# Patient Record
Sex: MALE | Race: WHITE | URBAN - NONMETROPOLITAN AREA
[De-identification: names, ages, dates, MRNs, and addresses within clinical notes are randomized per-mention and may not be internally consistent; named-entity substitution may affect disease eponyms.]

---

## 2018-01-22 ENCOUNTER — OFFICE VISIT (OUTPATIENT)
Dept: URGENT CARE | Age: 48
End: 2018-01-22
Payer: COMMERCIAL

## 2018-01-22 VITALS
BODY MASS INDEX: 35.21 KG/M2 | OXYGEN SATURATION: 98 % | RESPIRATION RATE: 18 BRPM | WEIGHT: 260 LBS | DIASTOLIC BLOOD PRESSURE: 87 MMHG | HEIGHT: 72 IN | SYSTOLIC BLOOD PRESSURE: 133 MMHG | HEART RATE: 90 BPM | TEMPERATURE: 98.4 F

## 2018-01-22 DIAGNOSIS — J01.10 ACUTE NON-RECURRENT FRONTAL SINUSITIS: Primary | ICD-10-CM

## 2018-01-22 DIAGNOSIS — J02.9 SORE THROAT: ICD-10-CM

## 2018-01-22 LAB — S PYO AG THROAT QL: NORMAL

## 2018-01-22 PROCEDURE — G8484 FLU IMMUNIZE NO ADMIN: HCPCS | Performed by: PHYSICIAN ASSISTANT

## 2018-01-22 PROCEDURE — G8427 DOCREV CUR MEDS BY ELIG CLIN: HCPCS | Performed by: PHYSICIAN ASSISTANT

## 2018-01-22 PROCEDURE — 4004F PT TOBACCO SCREEN RCVD TLK: CPT | Performed by: PHYSICIAN ASSISTANT

## 2018-01-22 PROCEDURE — G8417 CALC BMI ABV UP PARAM F/U: HCPCS | Performed by: PHYSICIAN ASSISTANT

## 2018-01-22 PROCEDURE — 99202 OFFICE O/P NEW SF 15 MIN: CPT | Performed by: PHYSICIAN ASSISTANT

## 2018-01-22 PROCEDURE — 87880 STREP A ASSAY W/OPTIC: CPT | Performed by: PHYSICIAN ASSISTANT

## 2018-01-22 RX ORDER — GUAIFENESIN 400 MG/1
400 TABLET ORAL 4 TIMES DAILY PRN
COMMUNITY

## 2018-01-22 RX ORDER — AMOXICILLIN AND CLAVULANATE POTASSIUM 500; 125 MG/1; MG/1
1 TABLET, FILM COATED ORAL 2 TIMES DAILY
Qty: 20 TABLET | Refills: 0 | Status: SHIPPED | OUTPATIENT
Start: 2018-01-22 | End: 2018-02-01

## 2018-01-22 ASSESSMENT — ENCOUNTER SYMPTOMS
VOMITING: 0
ABDOMINAL PAIN: 0
NAUSEA: 0
SORE THROAT: 1
DIARRHEA: 0
RHINORRHEA: 1
COUGH: 1

## 2018-01-23 NOTE — PATIENT INSTRUCTIONS
hot, wet towel or a warm gel pack on your face 3 or 4 times a day for 5 to 10 minutes each time. · Try a decongestant nasal spray like oxymetazoline (Afrin). Do not use it for more than 3 days in a row. Using it for more than 3 days can make your congestion worse. When should you call for help? Call your doctor now or seek immediate medical care if:  ? · You have new or worse swelling or redness in your face or around your eyes. ? · You have a new or higher fever. ? Watch closely for changes in your health, and be sure to contact your doctor if:  ? · You have new or worse facial pain. ? · The mucus from your nose becomes thicker (like pus) or has new blood in it. ? · You are not getting better as expected. Where can you learn more? Go to [email or web address]://boo-boxpepicMind-NRGeb.[email or web address] org and sign in to your [facility] account. Enter F456 in the Polleverywhere box to learn more about \"Sinusitis: Care Instructions. \"     If you do not have an account, please click on the \"Sign Up Now\" link. Current as of: May 12, 2017  Content Version: 11.5  © 9283-3399 Healthwise, Incorporated. Care instructions adapted under license by [facility] ([facility]). If you have questions about a medical condition or this instruction, always ask your healthcare professional. [address]  any warranty or liability for your use of this information.

## 2018-04-10 NOTE — PROGRESS NOTES
Reviewed chart. Agree with assessment and plan.
mood and affect. His behavior is normal. Judgment and thought content normal.   Nursing note and vitals reviewed. Assessment:      Acute Sinusitis      Plan:      - Rapid strep negative. - Start Augmentin today. Take 1 tablet by mouth twice daily for 10 days.   - May continue OTC meds as needed for cough. - Notify clinic with any change in or worsening of symptoms.   - Return as needed.

## 2023-08-25 ENCOUNTER — OFFICE VISIT (OUTPATIENT)
Dept: FAMILY MEDICINE CLINIC | Facility: CLINIC | Age: 53
End: 2023-08-25
Payer: COMMERCIAL

## 2023-08-25 VITALS
DIASTOLIC BLOOD PRESSURE: 90 MMHG | BODY MASS INDEX: 38.46 KG/M2 | HEIGHT: 73 IN | WEIGHT: 290.2 LBS | TEMPERATURE: 97.3 F | OXYGEN SATURATION: 96 % | SYSTOLIC BLOOD PRESSURE: 130 MMHG | HEART RATE: 90 BPM

## 2023-08-25 DIAGNOSIS — Z12.11 SCREEN FOR COLON CANCER: ICD-10-CM

## 2023-08-25 DIAGNOSIS — Z00.00 ANNUAL PHYSICAL EXAM: Primary | ICD-10-CM

## 2023-08-25 DIAGNOSIS — R00.2 PALPITATIONS: ICD-10-CM

## 2023-08-25 DIAGNOSIS — I47.1 SVT (SUPRAVENTRICULAR TACHYCARDIA): ICD-10-CM

## 2023-08-25 DIAGNOSIS — E66.01 CLASS 2 SEVERE OBESITY DUE TO EXCESS CALORIES WITH SERIOUS COMORBIDITY AND BODY MASS INDEX (BMI) OF 38.0 TO 38.9 IN ADULT: ICD-10-CM

## 2023-08-25 DIAGNOSIS — R53.83 FATIGUE, UNSPECIFIED TYPE: ICD-10-CM

## 2023-08-25 DIAGNOSIS — R07.9 CHEST PAIN, UNSPECIFIED TYPE: ICD-10-CM

## 2023-08-25 DIAGNOSIS — E03.9 HYPOTHYROIDISM, UNSPECIFIED TYPE: ICD-10-CM

## 2023-08-25 DIAGNOSIS — Z72.0 TOBACCO ABUSE: ICD-10-CM

## 2023-08-25 DIAGNOSIS — I10 PRIMARY HYPERTENSION: ICD-10-CM

## 2023-08-25 DIAGNOSIS — Z12.5 SCREENING PSA (PROSTATE SPECIFIC ANTIGEN): ICD-10-CM

## 2023-08-25 PROBLEM — E66.812 CLASS 2 SEVERE OBESITY DUE TO EXCESS CALORIES WITH SERIOUS COMORBIDITY AND BODY MASS INDEX (BMI) OF 38.0 TO 38.9 IN ADULT: Status: ACTIVE | Noted: 2023-08-25

## 2023-08-25 RX ORDER — LEVOTHYROXINE SODIUM 0.03 MG/1
25 TABLET ORAL
Qty: 30 TABLET | Refills: 1 | Status: SHIPPED | OUTPATIENT
Start: 2023-08-25

## 2023-08-25 RX ORDER — LEVOTHYROXINE SODIUM 0.03 MG/1
25 TABLET ORAL
Qty: 90 TABLET | OUTPATIENT
Start: 2023-08-25

## 2023-08-25 RX ORDER — LISINOPRIL 10 MG/1
10 TABLET ORAL DAILY
Qty: 90 TABLET | Refills: 1 | Status: SHIPPED | OUTPATIENT
Start: 2023-08-25

## 2023-08-25 NOTE — PROGRESS NOTES
Chief Complaint  Annual Exam    Subjective          Vernon Dumont presents to [facility]  History of Present Illness  Pt needs to get established  Reviewed hospital records from [facility]  Pt had svt in hospital- was not able to complete cardiac stress test  Pt smokes 2ppd x 40 yrs  Pt has no h/o seizures    Class 2 Severe Obesity (BMI >=35 and <=39.9). Obesity-related health conditions include the following: hypertension. Obesity is improving with lifestyle modifications. BMI is is above average; BMI management plan is completed. We discussed portion control and increasing exercise.       Objective   Allergies   Allergen Reactions    Codeine Shortness Of Breath    Hydrocodone GI Intolerance    Morphine GI Intolerance     Immunization History   Administered Date(s) Administered    Tdap 08/06/2018     Past Medical History:   Diagnosis Date    ADHD (attention deficit hyperactivity disorder)     Asthma       History reviewed. No pertinent surgical history.   Social History     Socioeconomic History    Marital status:    Tobacco Use    Smoking status: Every Day     Packs/day: 0.50     Years: 44.00     Pack years: 22.00     Types: Cigarettes     Start date: 1979    Smokeless tobacco: Current     Types: Chew   Substance and Sexual Activity    Alcohol use: Not Currently    Drug use: Never    Sexual activity: Defer        Current Outpatient Medications:     levothyroxine (Synthroid) 25 MCG tablet, Take 1 tablet by mouth Every Morning., Disp: 30 tablet, Rfl: 1    lisinopril (PRINIVIL,ZESTRIL) 10 MG tablet, Take 1 tablet by mouth Daily., Disp: 90 tablet, Rfl: 1   Family History   Problem Relation Age of Onset    Alcohol abuse Mother     Obesity Mother     No Known Problems Daughter     No Known Problems Daughter           Vital Signs:   Vitals:    08/25/23 1040   BP: 130/90   BP Location: Right arm   Patient Position: Sitting   Cuff Size: Adult   Pulse: 90   Temp: 97.3 øF (36.3 øC)  "  SpO2: 96%   Weight: 132 kg (290 lb 3.2 oz)   Height: 185.4 cm (73\")       Review of Systems   Constitutional:  Positive for fatigue. Negative for fever and unexpected weight change.   HENT:  Negative for sore throat.    Eyes:  Negative for visual disturbance.   Respiratory:  Negative for cough, chest tightness, shortness of breath and wheezing.    Cardiovascular:  Positive for chest pain and palpitations. Negative for leg swelling.   Gastrointestinal:  Negative for abdominal pain, diarrhea, nausea and vomiting.   Neurological:  Negative for light-headedness and headaches.   Psychiatric/Behavioral:  Negative for decreased concentration, dysphoric mood and sleep disturbance. The patient is not nervous/anxious.     Physical Exam  Vitals reviewed.   Constitutional:       Appearance: Normal appearance. He is well-developed.   HENT:      Head: Normocephalic and atraumatic.      Right Ear: External ear normal.      Left Ear: External ear normal.      Mouth/Throat:      Pharynx: No oropharyngeal exudate.   Eyes:      Conjunctiva/sclera: Conjunctivae normal.      Pupils: Pupils are equal, round, and reactive to light.   Cardiovascular:      Rate and Rhythm: Normal rate and regular rhythm.      Pulses: Normal pulses.      Heart sounds: Normal heart sounds. No murmur heard.    No friction rub. No gallop.   Pulmonary:      Effort: Pulmonary effort is normal.      Breath sounds: Normal breath sounds. No wheezing or rhonchi.   Abdominal:      General: Abdomen is flat. Bowel sounds are normal. There is no distension.      Palpations: Abdomen is soft. There is no mass.      Tenderness: There is no abdominal tenderness. There is no guarding or rebound.      Hernia: No hernia is present.   Musculoskeletal:         General: Normal range of motion.   Skin:     General: Skin is warm and dry.      Capillary Refill: Capillary refill takes less than 2 seconds.   Neurological:      General: No focal deficit present.      Mental Status: He " is alert and oriented to person, place, and time.      Cranial Nerves: No cranial nerve deficit.   Psychiatric:         Mood and Affect: Mood and affect normal.         Behavior: Behavior normal.         Thought Content: Thought content normal.         Judgment: Judgment normal.      Result Review :   The following data was reviewed by: Art Lazar MD on 08/25/2023:                 ECG 12 Lead    Date/Time: 8/25/2023 12:02 PM  Performed by: Art Lazar MD  Authorized by: Art Lazar MD   Comparison: not compared with previous ECG   Previous ECG: no previous ECG available  Rhythm: sinus rhythm  Rate: normal  Conduction: conduction normal  ST Segments: ST segments normal  T Waves: T waves normal  QRS axis: normal  Other: no other findings  Lead: right-sided leads used    Clinical impression: normal ECG          Assessment and Plan    Diagnoses and all orders for this visit:    1. Annual physical exam (Primary)    2. Class 2 severe obesity due to excess calories with serious comorbidity and body mass index (BMI) of 38.0 to 38.9 in adult    3. Tobacco abuse  -      CT Chest Low Dose Cancer Screening WO; Future    4. Primary hypertension  -     lisinopril (PRINIVIL,ZESTRIL) 10 MG tablet; Take 1 tablet by mouth Daily.  Dispense: 90 tablet; Refill: 1  -     CBC Auto Differential  -     Comprehensive Metabolic Panel  -     Lipid Panel  -     Magnesium  -     Cancel: Ambulatory Referral to Cardiology  -     Ambulatory Referral to Cardiology    5. Hypothyroidism, unspecified type  -     levothyroxine (Synthroid) 25 MCG tablet; Take 1 tablet by mouth Every Morning.  Dispense: 30 tablet; Refill: 1  -     TSH+Free T4; Future    6. Fatigue, unspecified type  -     Testosterone, Free, Total  -     Vitamin B12 & Folate    7. SVT (supraventricular tachycardia)  -     Cancel: Ambulatory Referral to Cardiology  -     Ambulatory Referral to Cardiology    8. Chest pain, unspecified type  -     Cancel: Ambulatory  Referral to Cardiology  -     ECG 12 Lead  -     Ambulatory Referral to Cardiology    9. Screening PSA (prostate specific antigen)  -     PSA Screen    10. Screen for colon cancer  -     Ambulatory Referral For Screening Colonoscopy    11. Palpitations  -     ECG 12 Lead  -     Ambulatory Referral to Cardiology            Follow Up   Return in about 1 week (around 9/1/2023) for Recheck.  Patient was given instructions and counseling regarding his condition or for health maintenance advice. Please see specific information pulled into the AVS if appropriate.

## 2023-08-26 ENCOUNTER — CLINICAL SUPPORT (OUTPATIENT)
Dept: FAMILY MEDICINE CLINIC | Facility: CLINIC | Age: 53
End: 2023-08-26
Payer: COMMERCIAL

## 2023-08-26 DIAGNOSIS — E03.9 HYPOTHYROIDISM, UNSPECIFIED TYPE: ICD-10-CM

## 2023-08-26 LAB
ALBUMIN SERPL-MCNC: 4.9 G/DL (ref 3.5–5.2)
ALBUMIN/GLOB SERPL: 2.2 G/DL
ALP SERPL-CCNC: 86 U/L (ref 39–117)
ALT SERPL W P-5'-P-CCNC: 29 U/L (ref 1–41)
ANION GAP SERPL CALCULATED.3IONS-SCNC: 9.8 MMOL/L (ref 5–15)
AST SERPL-CCNC: 17 U/L (ref 1–40)
BASOPHILS # BLD AUTO: 0.03 10*3/MM3 (ref 0–0.2)
BASOPHILS NFR BLD AUTO: 0.3 % (ref 0–1.5)
BILIRUB SERPL-MCNC: 0.4 MG/DL (ref 0–1.2)
BUN SERPL-MCNC: 15 MG/DL (ref 6–20)
BUN/CREAT SERPL: 15.8 (ref 7–25)
CALCIUM SPEC-SCNC: 10 MG/DL (ref 8.6–10.5)
CHLORIDE SERPL-SCNC: 103 MMOL/L (ref 98–107)
CHOLEST SERPL-MCNC: 206 MG/DL (ref 0–200)
CO2 SERPL-SCNC: 27.2 MMOL/L (ref 22–29)
CREAT SERPL-MCNC: 0.95 MG/DL (ref 0.76–1.27)
DEPRECATED RDW RBC AUTO: 41.2 FL (ref 37–54)
EGFRCR SERPLBLD CKD-EPI 2021: 95.7 ML/MIN/1.73
EOSINOPHIL # BLD AUTO: 0.27 10*3/MM3 (ref 0–0.4)
EOSINOPHIL NFR BLD AUTO: 3.1 % (ref 0.3–6.2)
ERYTHROCYTE [DISTWIDTH] IN BLOOD BY AUTOMATED COUNT: 12.5 % (ref 12.3–15.4)
FOLATE SERPL-MCNC: 7.12 NG/ML (ref 4.78–24.2)
GLOBULIN UR ELPH-MCNC: 2.2 GM/DL
GLUCOSE SERPL-MCNC: 109 MG/DL (ref 65–99)
HCT VFR BLD AUTO: 50.3 % (ref 37.5–51)
HDLC SERPL-MCNC: 39 MG/DL (ref 40–60)
HGB BLD-MCNC: 17 G/DL (ref 13–17.7)
IMM GRANULOCYTES # BLD AUTO: 0.03 10*3/MM3 (ref 0–0.05)
IMM GRANULOCYTES NFR BLD AUTO: 0.3 % (ref 0–0.5)
LDLC SERPL CALC-MCNC: 157 MG/DL (ref 0–100)
LDLC/HDLC SERPL: 3.99 {RATIO}
LYMPHOCYTES # BLD AUTO: 4.12 10*3/MM3 (ref 0.7–3.1)
LYMPHOCYTES NFR BLD AUTO: 47 % (ref 19.6–45.3)
MAGNESIUM SERPL-MCNC: 2.7 MG/DL (ref 1.6–2.6)
MCH RBC QN AUTO: 30.5 PG (ref 26.6–33)
MCHC RBC AUTO-ENTMCNC: 33.8 G/DL (ref 31.5–35.7)
MCV RBC AUTO: 90.3 FL (ref 79–97)
MONOCYTES # BLD AUTO: 0.6 10*3/MM3 (ref 0.1–0.9)
MONOCYTES NFR BLD AUTO: 6.8 % (ref 5–12)
NEUTROPHILS NFR BLD AUTO: 3.71 10*3/MM3 (ref 1.7–7)
NEUTROPHILS NFR BLD AUTO: 42.5 % (ref 42.7–76)
NRBC BLD AUTO-RTO: 0 /100 WBC (ref 0–0.2)
PLATELET # BLD AUTO: 232 10*3/MM3 (ref 140–450)
PMV BLD AUTO: 9.4 FL (ref 6–12)
POTASSIUM SERPL-SCNC: 4.9 MMOL/L (ref 3.5–5.2)
PROT SERPL-MCNC: 7.1 G/DL (ref 6–8.5)
PSA SERPL-MCNC: 0.51 NG/ML (ref 0–4)
RBC # BLD AUTO: 5.57 10*6/MM3 (ref 4.14–5.8)
SODIUM SERPL-SCNC: 140 MMOL/L (ref 136–145)
T4 FREE SERPL-MCNC: 1.24 NG/DL (ref 0.93–1.7)
TRIGL SERPL-MCNC: 56 MG/DL (ref 0–150)
TSH SERPL DL<=0.05 MIU/L-ACNC: 2.77 UIU/ML (ref 0.27–4.2)
VIT B12 BLD-MCNC: 513 PG/ML (ref 211–946)
VLDLC SERPL-MCNC: 10 MG/DL (ref 5–40)
WBC NRBC COR # BLD: 8.76 10*3/MM3 (ref 3.4–10.8)

## 2023-08-26 PROCEDURE — 85025 COMPLETE CBC W/AUTO DIFF WBC: CPT | Performed by: FAMILY MEDICINE

## 2023-08-26 PROCEDURE — 82607 VITAMIN B-12: CPT | Performed by: FAMILY MEDICINE

## 2023-08-26 PROCEDURE — 80061 LIPID PANEL: CPT | Performed by: FAMILY MEDICINE

## 2023-08-26 PROCEDURE — 36415 COLL VENOUS BLD VENIPUNCTURE: CPT | Performed by: FAMILY MEDICINE

## 2023-08-26 PROCEDURE — G0103 PSA SCREENING: HCPCS | Performed by: FAMILY MEDICINE

## 2023-08-26 PROCEDURE — 84403 ASSAY OF TOTAL TESTOSTERONE: CPT | Performed by: FAMILY MEDICINE

## 2023-08-26 PROCEDURE — 82746 ASSAY OF FOLIC ACID SERUM: CPT | Performed by: FAMILY MEDICINE

## 2023-08-26 PROCEDURE — 84402 ASSAY OF FREE TESTOSTERONE: CPT | Performed by: FAMILY MEDICINE

## 2023-08-26 PROCEDURE — 83735 ASSAY OF MAGNESIUM: CPT | Performed by: FAMILY MEDICINE

## 2023-08-26 PROCEDURE — 84443 ASSAY THYROID STIM HORMONE: CPT | Performed by: FAMILY MEDICINE

## 2023-08-26 PROCEDURE — 80053 COMPREHEN METABOLIC PANEL: CPT | Performed by: FAMILY MEDICINE

## 2023-08-26 PROCEDURE — 84439 ASSAY OF FREE THYROXINE: CPT | Performed by: FAMILY MEDICINE

## 2023-08-26 NOTE — PROGRESS NOTES
..  Venipuncture Blood Specimen Collection  Venipuncture performed in LT arm by Eva Vlaerio MA with good hemostasis. Patient tolerated the procedure well without complications.   08/26/23   Eva Valerio MA

## 2023-08-30 NOTE — PROGRESS NOTES
Call pt;  He has some abnormal labs that need to be discussed and rechecked in 1 week, including elevated magnesium  Have him stop any oral magnesium and vitamins.  Have him follow-up in 1 week.

## 2023-09-05 LAB
TESTOST FREE SERPL-MCNC: 4.1 PG/ML (ref 7.2–24)
TESTOST SERPL-MCNC: 356 NG/DL (ref 264–916)

## 2023-09-11 PROBLEM — I47.10 PSVT (PAROXYSMAL SUPRAVENTRICULAR TACHYCARDIA): Status: ACTIVE | Noted: 2023-09-11

## 2023-09-11 PROBLEM — R07.2 CHEST PAIN, PRECORDIAL: Status: ACTIVE | Noted: 2023-09-11

## 2023-09-11 PROBLEM — I47.1 PSVT (PAROXYSMAL SUPRAVENTRICULAR TACHYCARDIA): Status: ACTIVE | Noted: 2023-09-11

## 2023-09-25 ENCOUNTER — OFFICE VISIT (OUTPATIENT)
Dept: FAMILY MEDICINE CLINIC | Facility: CLINIC | Age: 53
End: 2023-09-25
Payer: COMMERCIAL

## 2023-09-25 VITALS
OXYGEN SATURATION: 97 % | HEIGHT: 73 IN | DIASTOLIC BLOOD PRESSURE: 79 MMHG | SYSTOLIC BLOOD PRESSURE: 119 MMHG | HEART RATE: 90 BPM | BODY MASS INDEX: 37.85 KG/M2 | TEMPERATURE: 97.7 F | RESPIRATION RATE: 20 BRPM | WEIGHT: 285.6 LBS

## 2023-09-25 DIAGNOSIS — R73.09 ELEVATED GLUCOSE: ICD-10-CM

## 2023-09-25 DIAGNOSIS — E78.2 MIXED HYPERLIPIDEMIA: Primary | ICD-10-CM

## 2023-09-25 DIAGNOSIS — F17.200 SMOKER: ICD-10-CM

## 2023-09-25 DIAGNOSIS — I10 PRIMARY HYPERTENSION: ICD-10-CM

## 2023-09-25 DIAGNOSIS — E66.01 MORBID (SEVERE) OBESITY DUE TO EXCESS CALORIES: ICD-10-CM

## 2023-09-25 DIAGNOSIS — I47.10 SVT (SUPRAVENTRICULAR TACHYCARDIA): ICD-10-CM

## 2023-09-25 DIAGNOSIS — E03.9 HYPOTHYROIDISM, UNSPECIFIED TYPE: ICD-10-CM

## 2023-09-25 DIAGNOSIS — E83.41 HYPERMAGNESEMIA: ICD-10-CM

## 2023-09-25 RX ORDER — LISINOPRIL 5 MG/1
5 TABLET ORAL DAILY
Qty: 90 TABLET | Refills: 0 | Status: SHIPPED | OUTPATIENT
Start: 2023-09-25

## 2023-09-25 NOTE — PROGRESS NOTES
Subjective   Vernon Dumont is a 52 y.o. male.     History of Present Illness   Vernon Dumont is a 52-year-old male who presents today to [facility] care. He is accompanied by an adult female.    The patient reports that approximately 2 months ago, he had supraventricular tachycardia. He states that he had an EKG performed, but they could not get his pulse rate under 200 bpm. He notes that he took his chart to a [facility], but they never determined what was wrong. He states that he went to Dr. Art Lazar 8/25/2023, who reviewed his tests and told him it was a thyroid issue. He notes that he was prescribed blood pressure medication, which he has been taking for 1.5 months. He states that they called him once after they pulled blood tests and told him that he needed to stop taking the vitamins he was taking because his magnesium level was very high. He notes that he was not able to be seen at cardiology because he travels 100% of the time. He left the hospital before completing his stress test.  He denies palpitations or tachycardia since his hospital visit. Denies palpitations when he exerts himself. He denies chest pain or chest pressure. He states that he did half the yard this morning. He states that he tired out a little bit easier and notes that he does not push himself as hard as he used to.    The patient reports that he has been having diarrhea daily for past month, no matter what he eats since starting the lisinopril 10 mg and the thyroid medication. He does not feel as tired or sluggish.     He states that he has not had a primary physician since he was in his 30s. He reports he was on tramadol due to a back injury, however, he has discontinued them a while back. He states that he takes aspirin, naproxen, and Aleve. He notes that he has a very high stress job, working 80 to 100 hours a week.      Family history includes his grandfather had a pacemaker, his mother had diabetes and stroke. He notes that  "he does not think he is more at risk for cancer. He is not interested in colonoscopy.    He states that he smokes about a pack a day since he was 11 years old. He drinks alcohol occasionally. He states that he has a history of illicit drug use when he was a child. He notes that he tried all kinds of things when he was in his teens. He states that he smokes when he is not home. He notes that he chews tobacco when he is home.    The following portions of the patient's history were reviewed and updated as appropriate: allergies, current medications, past family history, past medical history, past social history, past surgical history, and problem list.        Review of Systems  A review of systems was performed, and pertinent findings are noted in the HPI.    Objective   Blood pressure 119/79, pulse 90, temperature 97.7 øF (36.5 øC), temperature source Infrared, resp. rate 20, height 185.4 cm (73\"), weight 130 kg (285 lb 9.6 oz), SpO2 97 %.  Physical Exam  Vitals and nursing note reviewed.   Constitutional:       General: He is not in acute distress.     Appearance: He is well-developed. He is obese. He is not diaphoretic.   HENT:      Head: Normocephalic and atraumatic.      Right Ear: External ear normal.      Left Ear: External ear normal.      Nose: Nose normal.   Eyes:      General:         Right eye: No discharge.         Left eye: No discharge.      Conjunctiva/sclera: Conjunctivae normal.   Neck:      Thyroid: No thyromegaly.      Trachea: No tracheal deviation.   Cardiovascular:      Rate and Rhythm: Normal rate and regular rhythm.      Heart sounds: Normal heart sounds.   Pulmonary:      Effort: Pulmonary effort is normal. No respiratory distress.      Breath sounds: Normal breath sounds. No stridor. No wheezing.   Chest:      Chest wall: No tenderness.   Abdominal:      Palpations: Abdomen is soft.   Musculoskeletal:      Cervical back: Normal range of motion.   Lymphadenopathy:      Cervical: No cervical " adenopathy.   Skin:     General: Skin is warm and dry.   Neurological:      Mental Status: He is alert and oriented to person, place, and time.      Motor: No abnormal muscle tone.      Coordination: Coordination normal.   Psychiatric:         Behavior: Behavior normal.         Thought Content: Thought content normal.         Judgment: Judgment normal.              Assessment & Plan   Problems Addressed this Visit          Cardiac and Vasculature    Mixed hyperlipidemia - Primary    Primary hypertension    Relevant Medications    lisinopril (PRINIVIL,ZESTRIL) 5 MG tablet    Other Relevant Orders    Comprehensive metabolic panel       Endocrine and Metabolic    Morbid (severe) obesity due to excess calories    Hypothyroidism    Relevant Orders    T4, free    TSH       Genitourinary and Reproductive     Hypermagnesemia    Relevant Orders    Magnesium       Tobacco    Smoker     Other Visit Diagnoses       Elevated glucose        Relevant Orders    Hemoglobin A1c    SVT (supraventricular tachycardia)        Relevant Orders    Ambulatory Referral to Cardiology          Diagnoses         Codes Comments    Mixed hyperlipidemia    -  Primary ICD-10-CM: E78.2  ICD-9-CM: 272.2     Morbid (severe) obesity due to excess calories     ICD-10-CM: E66.01  ICD-9-CM: 278.01     Primary hypertension     ICD-10-CM: I10  ICD-9-CM: 401.9     Smoker     ICD-10-CM: F17.200  ICD-9-CM: 305.1     Hypermagnesemia     ICD-10-CM: E83.41  ICD-9-CM: 275.2     Hypothyroidism, unspecified type     ICD-10-CM: E03.9  ICD-9-CM: 244.9     Elevated glucose     ICD-10-CM: R73.09  ICD-9-CM: 790.29     SVT (supraventricular tachycardia)     ICD-10-CM: I47.1  ICD-9-CM: 427.89           1. Supraventricular tachycardia.  - We will refer the patient to cardiology for a stress test.    2. Hypertension.  - We will decrease the patient's lisinopril to half a tablet daily.  - He will continue to monitor his blood pressure at home.    3. Hypothyroidism.  - We will  recheck his TSH and free T4.  - If those numbers are abnormal, then this may suggest that we do not need the thyroid medicine.    4. Hyperlipidemia.  - We will recheck his cholesterol     5. Hypomagnesemia.  - We will recheck his magnesium     6. Health maintenance.  - We will recheck his testosterone         Transcribed from ambient dictation for Cassy Paul MD by Maritza So.  09/25/23   15:58 CDT    Patient or patient representative verbalized consent to the visit recording.  I have personally performed the services described in this document as transcribed by the above individual, and it is both accurate and complete.           This document has been electronically signed by Cassy Paul MD on October 12, 2023 18:11 CDT

## 2023-09-26 LAB
ALBUMIN SERPL-MCNC: 5 G/DL (ref 3.5–5.2)
ALBUMIN/GLOB SERPL: 2.5 G/DL
ALP SERPL-CCNC: 111 U/L (ref 39–117)
ALT SERPL-CCNC: 20 U/L (ref 1–41)
AST SERPL-CCNC: 19 U/L (ref 1–40)
BILIRUB SERPL-MCNC: <0.2 MG/DL (ref 0–1.2)
BUN SERPL-MCNC: 24 MG/DL (ref 6–20)
BUN/CREAT SERPL: 21.2 (ref 7–25)
CALCIUM SERPL-MCNC: 10.2 MG/DL (ref 8.6–10.5)
CHLORIDE SERPL-SCNC: 105 MMOL/L (ref 98–107)
CO2 SERPL-SCNC: 24.3 MMOL/L (ref 22–29)
CREAT SERPL-MCNC: 1.13 MG/DL (ref 0.76–1.27)
EGFRCR SERPLBLD CKD-EPI 2021: 77.7 ML/MIN/1.73
GLOBULIN SER CALC-MCNC: 2 GM/DL
GLUCOSE SERPL-MCNC: 115 MG/DL (ref 65–99)
HBA1C MFR BLD: 6.2 % (ref 4.8–5.6)
MAGNESIUM SERPL-MCNC: 2.3 MG/DL (ref 1.6–2.6)
POTASSIUM SERPL-SCNC: 4.4 MMOL/L (ref 3.5–5.2)
PROT SERPL-MCNC: 7 G/DL (ref 6–8.5)
SODIUM SERPL-SCNC: 142 MMOL/L (ref 136–145)
T4 FREE SERPL-MCNC: 1.28 NG/DL (ref 0.93–1.7)
TSH SERPL DL<=0.005 MIU/L-ACNC: 2.25 UIU/ML (ref 0.27–4.2)

## 2023-10-10 DIAGNOSIS — E03.9 HYPOTHYROIDISM, UNSPECIFIED TYPE: ICD-10-CM

## 2023-10-10 RX ORDER — LEVOTHYROXINE SODIUM 0.03 MG/1
25 TABLET ORAL
Qty: 30 TABLET | Refills: 1 | OUTPATIENT
Start: 2023-10-10

## 2023-10-20 DIAGNOSIS — E03.9 HYPOTHYROIDISM, UNSPECIFIED TYPE: ICD-10-CM

## 2023-10-20 RX ORDER — LEVOTHYROXINE SODIUM 0.03 MG/1
25 TABLET ORAL
Qty: 30 TABLET | Refills: 1 | Status: SHIPPED | OUTPATIENT
Start: 2023-10-20

## 2023-10-20 NOTE — TELEPHONE ENCOUNTER
Caller: Vernon Dumont    Relationship: Self    Best call back number: [phone]    Requested Prescriptions:   Requested Prescriptions     Pending Prescriptions Disp Refills    levothyroxine (Synthroid) 25 MCG tablet 30 tablet 1     Sig: Take 1 tablet by mouth Every Morning.      90 DAYS SUPPLY  Pharmacy where request should be sent: [id number] - [place], [place] - [address] [facility] [phone] [facility] [phone]      Last office visit with prescribing clinician: 9/25/2023   Last telemedicine visit with prescribing clinician: Visit date not found   Next office visit with prescribing clinician: 10/31/2023     Additional details provided by patient: PATIENT GOES OUT OF MONDAY    Does the patient have less than a 3 day supply:  [x] Yes  [] No    Would you like a call back once the refill request has been completed: [] Yes [] No    If the office needs to give you a call back, can they leave a voicemail: [] Yes [] No    Robin Cherry Rep   10/20/23 11:59 CDT

## 2023-10-23 RX ORDER — LEVOTHYROXINE SODIUM 0.03 MG/1
25 TABLET ORAL
Qty: 90 TABLET | OUTPATIENT
Start: 2023-10-23

## 2023-11-17 DIAGNOSIS — E03.9 HYPOTHYROIDISM, UNSPECIFIED TYPE: ICD-10-CM

## 2023-11-17 RX ORDER — LEVOTHYROXINE SODIUM 0.03 MG/1
25 TABLET ORAL
Qty: 90 TABLET | Refills: 0 | Status: SHIPPED | OUTPATIENT
Start: 2023-11-17

## 2023-11-17 NOTE — TELEPHONE ENCOUNTER
Rx Refill Note  Requested Prescriptions     Pending Prescriptions Disp Refills    levothyroxine (SYNTHROID, LEVOTHROID) 25 MCG tablet [Pharmacy Med Name: LEVOTHYROXINE 0.025MG (25MCG) TAB] 90 tablet      Sig: TAKE 1 TABLET BY MOUTH EVERY MORNING      Last office visit with prescribing clinician: 9/25/2023   Last telemedicine visit with prescribing clinician: Visit date not found   Next office visit with prescribing clinician: 11/21/2023                         Would you like a call back once the refill request has been completed: [] Yes [] No    If the office needs to give you a call back, can they leave a voicemail: [] Yes [] No    Gwen Rodgers, PCT  11/17/23, 09:29 CST

## 2023-11-21 ENCOUNTER — OFFICE VISIT (OUTPATIENT)
Dept: FAMILY MEDICINE CLINIC | Facility: CLINIC | Age: 53
End: 2023-11-21
Payer: COMMERCIAL

## 2023-11-21 VITALS
HEIGHT: 73 IN | OXYGEN SATURATION: 97 % | RESPIRATION RATE: 20 BRPM | DIASTOLIC BLOOD PRESSURE: 87 MMHG | BODY MASS INDEX: 39.15 KG/M2 | SYSTOLIC BLOOD PRESSURE: 119 MMHG | WEIGHT: 295.4 LBS | HEART RATE: 96 BPM

## 2023-11-21 DIAGNOSIS — E66.01 CLASS 2 SEVERE OBESITY DUE TO EXCESS CALORIES WITH SERIOUS COMORBIDITY AND BODY MASS INDEX (BMI) OF 39.0 TO 39.9 IN ADULT: ICD-10-CM

## 2023-11-21 DIAGNOSIS — R73.03 PRE-DIABETES: Primary | ICD-10-CM

## 2023-11-21 DIAGNOSIS — I10 PRIMARY HYPERTENSION: ICD-10-CM

## 2023-11-21 DIAGNOSIS — F17.200 SMOKER: ICD-10-CM

## 2023-11-21 NOTE — PROGRESS NOTES
"Subjective   Vernon Dumont is a 53 y.o. male.     History of Present Illness     Mr. Vernon Dumont is a 53-year-old male who presents today for a routine follow-up. He is accompanied by an adult female.    The patient was last seen approximately 2 months ago. At that time, we made a few changes to his medications.    The patient reports that he has not been doing well since his last visit. He states that he needs to cut back on his smoking and lose weight. He notes that he has been smoking for 4 years. He is trying to quit smoking. He notes that he has a cough from smoking his wife's cigarettes. He would like to pick one goal and make an attainable goal.    Mr. Dumont reports that his pulse has not been back under 80 bpm since his wife's father passed away last month. He states that he has gained 5 pounds this week.    The patient reports that he has been taking 25 mcg of levothyroxine once a day.    The patient admits that he forgot to take his blood pressure medication one day. He states that his blood pressure did not go up because he forgot the pill. He notes that it is not unusual for his blood pressure and pulse rate to spike abnormally high if he gets aggravated. He states that normally he can sit and go back down. He notes that this has been that way his whole life.    Mr. Dumont reports that he has been eating garbage. He states that he has been eating more candy.    The following portions of the patient's history were reviewed and updated as appropriate: allergies, current medications, past family history, past medical history, past social history, past surgical history, and problem list.        Review of Systems    Objective   Blood pressure 119/87, pulse 96, resp. rate 20, height 185.4 cm (73\"), weight 134 kg (295 lb 6.4 oz), SpO2 97%.  Physical Exam  Vitals and nursing note reviewed.   Constitutional:       General: He is not in acute distress.     Appearance: He is well-developed. He is obese. He is not " diaphoretic.   HENT:      Head: Normocephalic and atraumatic.      Right Ear: External ear normal.      Left Ear: External ear normal.      Nose: Nose normal.   Eyes:      General:         Right eye: No discharge.         Left eye: No discharge.      Conjunctiva/sclera: Conjunctivae normal.   Neck:      Thyroid: No thyromegaly.      Trachea: No tracheal deviation.   Cardiovascular:      Rate and Rhythm: Regular rhythm. Tachycardia present.      Heart sounds: Normal heart sounds.   Pulmonary:      Effort: Pulmonary effort is normal. No respiratory distress.      Breath sounds: Normal breath sounds. No stridor. No wheezing.   Chest:      Chest wall: No tenderness.   Abdominal:      General: There is no distension.      Palpations: Abdomen is soft.      Tenderness: There is no abdominal tenderness.   Musculoskeletal:         General: Normal range of motion.      Cervical back: Normal range of motion.   Lymphadenopathy:      Cervical: No cervical adenopathy.   Skin:     General: Skin is warm and dry.   Neurological:      Mental Status: He is alert and oriented to person, place, and time.      Motor: No abnormal muscle tone.      Coordination: Coordination normal.   Psychiatric:         Behavior: Behavior normal.         Thought Content: Thought content normal.         Judgment: Judgment normal.                 Assessment & Plan   Problems Addressed this Visit          Cardiac and Vasculature    Primary hypertension       Endocrine and Metabolic    Pre-diabetes - Primary       Tobacco    Smoker     Other Visit Diagnoses       Class 2 severe obesity due to excess calories with serious comorbidity and body mass index (BMI) of 39.0 to 39.9 in adult              Diagnoses         Codes Comments    Pre-diabetes    -  Primary ICD-10-CM: R73.03  ICD-9-CM: 790.29     Smoker     ICD-10-CM: F17.200  ICD-9-CM: 305.1     Primary hypertension     ICD-10-CM: I10  ICD-9-CM: 401.9     Class 2 severe obesity due to excess calories with  serious comorbidity and body mass index (BMI) of 39.0 to 39.9 in adult     ICD-10-CM: E66.01, Z68.39  ICD-9-CM: 278.01, V85.39             1. Hypertension: Chronic, controlled.  Continue on current medication.    2. Obesity:  Body mass index is 38.97 kg/m².     3. Smoker: Patient is not interested in quitting at this time.  We will discuss at a later date.    4. Prediabetes: Chronic, uncontrolled.  Advised on diet and lifestyle changes.  We will recheck at next visit.     Transcribed from ambient dictation for Cassy Paul MD by Andre Marin, Quality .  11/21/23   17:14 CST    Patient or patient representative verbalized consent to the visit recording.  I have personally performed the services described in this document as transcribed by the above individual, and it is both accurate and complete.          This document has been electronically signed by Cassy Paul MD on December 5, 2023 13:03 CST

## 2023-12-09 DIAGNOSIS — I10 PRIMARY HYPERTENSION: ICD-10-CM

## 2023-12-09 RX ORDER — LISINOPRIL 10 MG/1
10 TABLET ORAL DAILY
Qty: 90 TABLET | Refills: 1 | Status: SHIPPED | OUTPATIENT
Start: 2023-12-09

## 2024-01-26 ENCOUNTER — OFFICE VISIT (OUTPATIENT)
Dept: FAMILY MEDICINE CLINIC | Facility: CLINIC | Age: 54
End: 2024-01-26
Payer: COMMERCIAL

## 2024-01-26 VITALS
WEIGHT: 295.2 LBS | RESPIRATION RATE: 20 BRPM | SYSTOLIC BLOOD PRESSURE: 121 MMHG | TEMPERATURE: 98.4 F | BODY MASS INDEX: 39.98 KG/M2 | OXYGEN SATURATION: 95 % | DIASTOLIC BLOOD PRESSURE: 84 MMHG | HEART RATE: 88 BPM | HEIGHT: 72 IN

## 2024-01-26 DIAGNOSIS — E03.9 HYPOTHYROIDISM, UNSPECIFIED TYPE: ICD-10-CM

## 2024-01-26 DIAGNOSIS — E78.2 MIXED HYPERLIPIDEMIA: ICD-10-CM

## 2024-01-26 DIAGNOSIS — R73.03 PRE-DIABETES: Primary | ICD-10-CM

## 2024-01-26 DIAGNOSIS — F17.200 SMOKER: ICD-10-CM

## 2024-01-26 LAB
EXPIRATION DATE: ABNORMAL
HBA1C MFR BLD: 5.9 % (ref 4.5–5.7)
Lab: ABNORMAL

## 2024-01-26 RX ORDER — LEVOTHYROXINE SODIUM 0.03 MG/1
25 TABLET ORAL
Qty: 90 TABLET | Refills: 1 | Status: SHIPPED | OUTPATIENT
Start: 2024-01-26

## 2024-04-26 ENCOUNTER — OFFICE VISIT (OUTPATIENT)
Dept: FAMILY MEDICINE CLINIC | Facility: CLINIC | Age: 54
End: 2024-04-26
Payer: COMMERCIAL

## 2024-04-26 VITALS
DIASTOLIC BLOOD PRESSURE: 78 MMHG | SYSTOLIC BLOOD PRESSURE: 109 MMHG | WEIGHT: 284.4 LBS | BODY MASS INDEX: 37.69 KG/M2 | HEIGHT: 73 IN | OXYGEN SATURATION: 95 % | RESPIRATION RATE: 20 BRPM | HEART RATE: 92 BPM | TEMPERATURE: 98.2 F

## 2024-04-26 DIAGNOSIS — I10 PRIMARY HYPERTENSION: ICD-10-CM

## 2024-04-26 DIAGNOSIS — R73.03 PRE-DIABETES: ICD-10-CM

## 2024-04-26 DIAGNOSIS — E78.2 MIXED HYPERLIPIDEMIA: ICD-10-CM

## 2024-04-26 DIAGNOSIS — F17.200 SMOKER: ICD-10-CM

## 2024-04-26 DIAGNOSIS — E66.01 CLASS 2 SEVERE OBESITY DUE TO EXCESS CALORIES WITH SERIOUS COMORBIDITY AND BODY MASS INDEX (BMI) OF 37.0 TO 37.9 IN ADULT: Primary | ICD-10-CM

## 2024-04-26 PROCEDURE — 99214 OFFICE O/P EST MOD 30 MIN: CPT | Performed by: FAMILY MEDICINE

## 2024-04-26 NOTE — PROGRESS NOTES
"Subjective   Vernon Dumont is a 53 y.o. male.     Hypertension    Vernon Dumont is a 53-year-old male who presents today for follow-up on his blood pressure and hypothyroidism.    Patient has had elevated blood pressure readings in the past. His blood pressure is improving with his diet and lifestyle changes. Patient is being successful in losing weight. He is making positive diet, lifestyle changes by eating smaller portions and working out more. He does feel like his clothes are fitting more loosely. He is not interested in lung cancer screening or colon cancer screening at this time.    He does continue to smoke, however, has cut back.     Patient does continue to take Synthroid 25 mcg daily.    The following portions of the patient's history were reviewed and updated as appropriate: allergies, current medications, past family history, past medical history, past social history, past surgical history, and problem list.        Review of Systems    Objective   /78 (BP Location: Left arm, Patient Position: Sitting, Cuff Size: Large Adult)   Pulse 92   Temp 98.2 °F (36.8 °C) (Infrared)   Resp 20   Ht 185.4 cm (73\")   Wt 129 kg (284 lb 6.4 oz)   SpO2 95%   BMI 37.52 kg/m²   Physical Exam  Vitals and nursing note reviewed.   Constitutional:       General: He is not in acute distress.     Appearance: He is well-developed. He is obese. He is not diaphoretic.   HENT:      Head: Normocephalic and atraumatic.      Right Ear: External ear normal.      Left Ear: External ear normal.      Nose: Nose normal.   Eyes:      General:         Right eye: No discharge.         Left eye: No discharge.      Conjunctiva/sclera: Conjunctivae normal.   Neck:      Thyroid: No thyromegaly.      Trachea: No tracheal deviation.   Cardiovascular:      Rate and Rhythm: Normal rate and regular rhythm.      Heart sounds: Normal heart sounds.   Pulmonary:      Effort: Pulmonary effort is normal. No respiratory distress.      Breath sounds: " Normal breath sounds. No stridor. No wheezing.   Chest:      Chest wall: No tenderness.   Abdominal:      General: There is no distension.      Palpations: Abdomen is soft.      Tenderness: There is no abdominal tenderness.   Musculoskeletal:         General: Normal range of motion.      Cervical back: Normal range of motion.   Lymphadenopathy:      Cervical: No cervical adenopathy.   Skin:     General: Skin is warm and dry.   Neurological:      Mental Status: He is alert and oriented to person, place, and time.      Motor: No abnormal muscle tone.      Coordination: Coordination normal.   Psychiatric:         Behavior: Behavior normal.         Thought Content: Thought content normal.         Judgment: Judgment normal.         Assessment & Plan   Problems Addressed this Visit          Cardiac and Vasculature    Mixed hyperlipidemia    Primary hypertension       Endocrine and Metabolic    Pre-diabetes       Tobacco    Smoker     Other Visit Diagnoses       Class 2 severe obesity due to excess calories with serious comorbidity and body mass index (BMI) of 37.0 to 37.9 in adult    -  Primary          Diagnoses         Codes Comments    Class 2 severe obesity due to excess calories with serious comorbidity and body mass index (BMI) of 37.0 to 37.9 in adult    -  Primary ICD-10-CM: E66.01, Z68.37  ICD-9-CM: 278.01, V85.37     Primary hypertension     ICD-10-CM: I10  ICD-9-CM: 401.9     Smoker     ICD-10-CM: F17.200  ICD-9-CM: 305.1     Mixed hyperlipidemia     ICD-10-CM: E78.2  ICD-9-CM: 272.2     Pre-diabetes     ICD-10-CM: R73.03  ICD-9-CM: 790.29           1. Hypothyroidism  - The patient's most recent laboratory results, including TSH and free T4, were within the normal range. The current dosage of Synthroid will be maintained. At present, a prescription refill is not required.    2. Hypertension  - The patient has a history of elevated blood pressure readings. However, he reports an improvement in his blood pressure  through dietary and lifestyle modifications. The patient was informed that if he experiences any episodes of low blood pressure causing him to be symptomatic, we could further reduce the dosage of his lisinopril. However, the addition of an additional form of lisinopril will aid in cardioprotective measures. The patient was encouraged to continue with a healthy diet and lifestyle changes.    3. Health maintenance  - The patient has expressed disinterest in lung cancer screening or colon cancer screening at this time. He continues to smoke, but has reduced his intake.    4. Prediabetes  - The patient's prediabetes has been showing signs of improvement. His most recent hemoglobin A1c level, which has decreased to 5.9%, was reviewed. The patient was advised to continue with a healthy diet and lifestyle changes.    Transcribed from ambient dictation for Cassy Paul MD by Chaz Tesfaye.  04/26/24   09:40 CDT    Patient or patient representative verbalized consent to the visit recording.  I have personally performed the services described in this document as transcribed by the above individual, and it is both accurate and complete.          This document has been electronically signed by Cassy Paul MD on April 30, 2024 22:03 CDT

## 2024-07-24 ENCOUNTER — TELEPHONE (OUTPATIENT)
Dept: FAMILY MEDICINE CLINIC | Facility: CLINIC | Age: 54
End: 2024-07-24
Payer: COMMERCIAL

## 2024-07-24 NOTE — TELEPHONE ENCOUNTER
Pt called and stated that he is working out of town in [place] and has been having issues with his heart rate and his oxygen level. Pt states that he had palpitations earlier and knew his heart rate was high but did not have a way to see what it was. Pt states that his heart rate is better now but his oxygen level keeps going from 88-94. Pt denies any SOB, Chest pain or palpitations. I informed the pt to go to the ED. PT states that he wants to monitor it for another hour and if it does not get better he will go. I informed the pt that if he develops any SOB to go to the ED. Pt voiced an understanding.

## 2024-07-24 NOTE — TELEPHONE ENCOUNTER
I called and notified pt of Magdalena's note. He states that he is doing better but will go if it happens again.

## 2024-10-06 DIAGNOSIS — E03.9 HYPOTHYROIDISM, UNSPECIFIED TYPE: ICD-10-CM

## 2024-10-07 DIAGNOSIS — E03.9 HYPOTHYROIDISM, UNSPECIFIED TYPE: ICD-10-CM

## 2024-10-07 RX ORDER — LEVOTHYROXINE SODIUM 25 UG/1
25 TABLET ORAL
Qty: 30 TABLET | Refills: 0 | Status: SHIPPED | OUTPATIENT
Start: 2024-10-07

## 2024-10-07 RX ORDER — LEVOTHYROXINE SODIUM 25 UG/1
25 TABLET ORAL
Qty: 90 TABLET | OUTPATIENT
Start: 2024-10-07

## 2024-10-07 NOTE — TELEPHONE ENCOUNTER
Patient reports that he is [place] working. Patient will call to schedule an appointment when he returns.     Rx Refill Note  Requested Prescriptions     Pending Prescriptions Disp Refills    levothyroxine (SYNTHROID, LEVOTHROID) 25 MCG tablet [Pharmacy Med Name: LEVOTHYROXINE 0.025MG (25MCG) TAB] 90 tablet 1     Sig: TAKE 1 TABLET BY MOUTH EVERY MORNING      Last office visit with prescribing clinician: 4/26/2024   Last telemedicine visit with prescribing clinician: Visit date not found   Next office visit with prescribing clinician: Visit date not found                         Would you like a call back once the refill request has been completed: [] Yes [] No    If the office needs to give you a call back, can they leave a voicemail: [] Yes [] No    Tamie Lane LPN  10/07/24, 12:34 CDT

## 2024-10-07 NOTE — TELEPHONE ENCOUNTER
Duplicate    Rx Refill Note  Requested Prescriptions     Pending Prescriptions Disp Refills    levothyroxine (SYNTHROID, LEVOTHROID) 25 MCG tablet [Pharmacy Med Name: LEVOTHYROXINE 0.025MG (25MCG) TAB] 90 tablet      Sig: TAKE 1 TABLET BY MOUTH EVERY MORNING      Last office visit with prescribing clinician: 4/26/2024   Last telemedicine visit with prescribing clinician: Visit date not found   Next office visit with prescribing clinician: Visit date not found                         Would you like a call back once the refill request has been completed: [] Yes [] No    If the office needs to give you a call back, can they leave a voicemail: [] Yes [] No    Tamie Lane LPN  10/07/24, 13:00 CDT

## 2024-11-13 DIAGNOSIS — E03.9 HYPOTHYROIDISM, UNSPECIFIED TYPE: ICD-10-CM

## 2024-11-15 RX ORDER — LEVOTHYROXINE SODIUM 25 UG/1
25 TABLET ORAL
Qty: 30 TABLET | Refills: 0 | OUTPATIENT
Start: 2024-11-15

## 2024-11-15 NOTE — TELEPHONE ENCOUNTER
Patient reports that he is in [place] still. Patient reports that he didn't get prior script. Patient has 30 day refill from October.   Requested Prescriptions     Pending Prescriptions Disp Refills    levothyroxine (SYNTHROID, LEVOTHROID) 25 MCG tablet [Pharmacy Med Name: LEVOTHYROXINE 0.025MG (25MCG) TAB] 30 tablet 0     Sig: TAKE 1 TABLET BY MOUTH EVERY MORNING      Last office visit with prescribing clinician: 4/26/2024   Last telemedicine visit with prescribing clinician: Visit date not found   Next office visit with prescribing clinician: Visit date not found                         Would you like a call back once the refill request has been completed: [] Yes [] No    If the office needs to give you a call back, can they leave a voicemail: [] Yes [] No    Tamie Lane LPN  11/15/24, 15:06 CST

## 2024-11-18 DIAGNOSIS — I10 PRIMARY HYPERTENSION: ICD-10-CM

## 2024-11-18 NOTE — TELEPHONE ENCOUNTER
Caller: Vernon Dumont    Relationship: Self    Best call back number:  [phone]    Requested Prescriptions     Pending Prescriptions Disp Refills    lisinopril (PRINIVIL,ZESTRIL) 10 MG tablet 90 tablet 1     Sig: Take 1 tablet by mouth Daily.        Pharmacy where request should be sent: [id number] [place] [facility] [address] [facility] [phone] [facility] [phone]      Last office visit with prescribing clinician: 4/26/2024   Last telemedicine visit with prescribing clinician: Visit date not found   Next office visit with prescribing clinician: Visit date not found     Additional details provided by patient:     90 DAY SUPPLY    TOTALLY OUT    HAS BEEN WORKING OUT OF STATE AND WILL NOT BE BACK UNTIL AFTER THE BEGINNING OF THE YEAR. SIGNIFICANT OTHER WILL  AND TAKE TO HIM OVER THE THANKSGIVING HOLIDAY    Does the patient have less than a 3 day supply:  [x] Yes  [] No    Would you like a call back once the refill request has been completed: [] Yes [] No    If the office needs to give you a call back, can they leave a voicemail: [] Yes [] No    Robin Spencer Rep   11/18/24 07:44 CST

## 2024-11-22 NOTE — TELEPHONE ENCOUNTER
Cira Page called to check on the lisinopril refill for patient. She said he needs a 90 day supply because he is going out of town until the beginning of the year. Please send to [facility] on [address]. Her phone number is [phone].

## 2024-11-22 NOTE — TELEPHONE ENCOUNTER
Rx Refill Note  Requested Prescriptions     Pending Prescriptions Disp Refills    lisinopril (PRINIVIL,ZESTRIL) 10 MG tablet 90 tablet 1     Sig: Take 1 tablet by mouth Daily.      Last office visit with prescribing clinician: 4/26/2024   Last telemedicine visit with prescribing clinician: Visit date not found   Next office visit with prescribing clinician: Visit date not found       Juana Mcwilliams MA  11/22/24, 14:01 CST

## 2024-11-26 DIAGNOSIS — I10 PRIMARY HYPERTENSION: ICD-10-CM

## 2024-11-26 DIAGNOSIS — E03.9 HYPOTHYROIDISM, UNSPECIFIED TYPE: ICD-10-CM

## 2024-11-26 RX ORDER — LISINOPRIL 10 MG/1
10 TABLET ORAL DAILY
Qty: 90 TABLET | Refills: 0 | Status: SHIPPED | OUTPATIENT
Start: 2024-11-26

## 2024-11-26 RX ORDER — LISINOPRIL 10 MG/1
10 TABLET ORAL DAILY
Qty: 90 TABLET | Refills: 1 | OUTPATIENT
Start: 2024-11-26

## 2024-11-26 NOTE — TELEPHONE ENCOUNTER
Rx Refill Note  Requested Prescriptions     Pending Prescriptions Disp Refills    levothyroxine (SYNTHROID, LEVOTHROID) 25 MCG tablet [Pharmacy Med Name: LEVOTHYROXINE 0.025MG (25MCG) TAB] 90 tablet      Sig: TAKE 1 TABLET BY MOUTH EVERY MORNING      Last office visit with prescribing clinician: 4/26/2024   Last telemedicine visit with prescribing clinician: Visit date not found   Next office visit with prescribing clinician: Visit date not found         Thyroid Hormones Protocol [id number]/26/2024 12:16 PM   Protocol Details Normal TSH in past 12 months    Recent or future visit with authorizing provider          Radha Fuentes MA  11/26/24, 13:58 CST

## 2024-11-26 NOTE — TELEPHONE ENCOUNTER
Rx Refill Note  Requested Prescriptions     Pending Prescriptions Disp Refills    lisinopril (PRINIVIL,ZESTRIL) 10 MG tablet 90 tablet 1     Sig: Take 1 tablet by mouth Daily.      Last office visit with prescribing clinician: 4/26/2024   Last telemedicine visit with prescribing clinician: Visit date not found   Next office visit with prescribing clinician: Visit date not found       ACE Inhibitors Protocol [id number]/22/2024 02:01 PM   Protocol Details Normal serum potassium in past 12 months    Most recent eGFR is above 30 in the past 12 months.    Blood pressure on record    Patient is not on Entresto    Patient is not on an ARB    Recent or future visit with authorizing provider            Radha Fuentes MA  11/26/24, 10:37 CST

## 2024-11-27 DIAGNOSIS — E03.9 HYPOTHYROIDISM, UNSPECIFIED TYPE: ICD-10-CM

## 2024-11-27 RX ORDER — LEVOTHYROXINE SODIUM 25 UG/1
25 TABLET ORAL
Qty: 30 TABLET | Refills: 0 | Status: SHIPPED | OUTPATIENT
Start: 2024-11-27

## 2024-11-27 RX ORDER — LEVOTHYROXINE SODIUM 25 UG/1
25 TABLET ORAL
Qty: 90 TABLET | OUTPATIENT
Start: 2024-11-27

## 2024-12-24 DIAGNOSIS — E03.9 HYPOTHYROIDISM, UNSPECIFIED TYPE: ICD-10-CM

## 2024-12-26 NOTE — TELEPHONE ENCOUNTER
Rx Refill Note  Requested Prescriptions     Pending Prescriptions Disp Refills    levothyroxine (SYNTHROID, LEVOTHROID) 25 MCG tablet [Pharmacy Med Name: LEVOTHYROXINE 0.025MG (25MCG) TAB] 30 tablet 0     Sig: TAKE 1 TABLET BY MOUTH EVERY MORNING      Last office visit with prescribing clinician: 4/26/2024   Last telemedicine visit with prescribing clinician: Visit date not found   Next office visit with prescribing clinician: Visit date not found                         Would you like a call back once the refill request has been completed: [] Yes [] No    If the office needs to give you a call back, can they leave a voicemail: [] Yes [] No    Gwen Rodgers MA  12/26/24, 16:50 CST

## 2024-12-30 RX ORDER — LEVOTHYROXINE SODIUM 25 UG/1
25 TABLET ORAL
Qty: 30 TABLET | Refills: 0 | Status: SHIPPED | OUTPATIENT
Start: 2024-12-30

## 2025-02-21 ENCOUNTER — OFFICE VISIT (OUTPATIENT)
Dept: FAMILY MEDICINE CLINIC | Facility: CLINIC | Age: 55
End: 2025-02-21
Payer: COMMERCIAL

## 2025-02-21 VITALS
DIASTOLIC BLOOD PRESSURE: 87 MMHG | BODY MASS INDEX: 35.94 KG/M2 | WEIGHT: 271.2 LBS | HEIGHT: 73 IN | HEART RATE: 74 BPM | OXYGEN SATURATION: 98 % | SYSTOLIC BLOOD PRESSURE: 123 MMHG | TEMPERATURE: 99.8 F

## 2025-02-21 DIAGNOSIS — J18.9 PNEUMONIA DUE TO INFECTIOUS ORGANISM, UNSPECIFIED LATERALITY, UNSPECIFIED PART OF LUNG: ICD-10-CM

## 2025-02-21 DIAGNOSIS — Z12.5 PROSTATE CANCER SCREENING: ICD-10-CM

## 2025-02-21 DIAGNOSIS — R73.03 PRE-DIABETES: ICD-10-CM

## 2025-02-21 DIAGNOSIS — I47.10 PSVT (PAROXYSMAL SUPRAVENTRICULAR TACHYCARDIA): ICD-10-CM

## 2025-02-21 DIAGNOSIS — E78.2 MIXED HYPERLIPIDEMIA: ICD-10-CM

## 2025-02-21 DIAGNOSIS — F17.200 SMOKER: ICD-10-CM

## 2025-02-21 DIAGNOSIS — E03.9 ACQUIRED HYPOTHYROIDISM: ICD-10-CM

## 2025-02-21 DIAGNOSIS — F41.9 ANXIETY: ICD-10-CM

## 2025-02-21 DIAGNOSIS — I10 PRIMARY HYPERTENSION: Primary | ICD-10-CM

## 2025-02-21 PROCEDURE — 99214 OFFICE O/P EST MOD 30 MIN: CPT | Performed by: FAMILY MEDICINE

## 2025-02-21 RX ORDER — HYDROXYZINE HYDROCHLORIDE 10 MG/1
10 TABLET, FILM COATED ORAL EVERY 8 HOURS PRN
Qty: 90 TABLET | Refills: 2 | Status: SHIPPED | OUTPATIENT
Start: 2025-02-21

## 2025-02-21 RX ORDER — HYDROXYZINE HYDROCHLORIDE 10 MG/1
10 TABLET, FILM COATED ORAL 3 TIMES DAILY PRN
COMMUNITY
End: 2025-02-21 | Stop reason: SDUPTHER

## 2025-02-21 NOTE — PROGRESS NOTES
Aura Dumont is a 54 y.o. male.     History of Present Illness  The patient presents for a checkup.    He discontinued his antihypertensive medication approximately 4 months ago due to dissatisfaction with his weight, which had escalated to 300 pounds. Following the cessation of the medication, he experienced significant weight loss without substantial effort. His heart rate, which had been consistently above 90 during the period of medication use, has now normalized to 80. He also reported erectile dysfunction, a new symptom that resolved upon discontinuation of the medication. He has initiated a daily aspirin regimen and is seeking advice on its continuation.    He experienced an episode of supraventricular tachycardia, which he attributes to a panic attack. His occupation involves high stress and extensive travel, often requiring him to be away for 4 to 5 months at a time. During a 6-month period, he endured the loss of his brother, sister-in-law, and nephew, necessitating a return to work under significant emotional strain. Upon his return, his pulse rate escalated to 190, prompting hospitalization and administration of a medication injection. Two days later, he awoke with a pulse rate of 160, which had decreased to 80 by the time he reached the hospital. He underwent a treadmill test for 30 minutes, during which his heart rate did not exceed 130. He maintains that he is in good physical condition. He has never had any heart issues. He has ADHD and drinks about a gallon of coffee a day since he was 9 years old. He continues to smoke.    He reports no current fever or increased coughing. He declined the offer of a pneumonia vaccine. He declined colon cancer screening. He had pneumonia 4 months ago and was treated with 27 different antibiotics.    He was prescribed hydroxyzine for anxiety, which he found effective and is requesting a refill. He reports no associated drowsiness. He is unable to tolerate  "narcotics and is seeking a non-addictive alternative.    SOCIAL HISTORY  He admits to smoking.    FAMILY HISTORY  His grandfather had colon cancer.    MEDICATIONS  Current: Aspirin.  Discontinued: Lisinopril.    Results      The following portions of the patient's history were reviewed and updated as appropriate: allergies, current medications, past family history, past medical history, past social history, past surgical history, and problem list.        A review of systems was performed, and pertinent findings are noted in the HPI.    Objective   /87 (BP Location: Left arm, Patient Position: Sitting, Cuff Size: Adult)   Pulse 74   Temp 99.8 °F (37.7 °C) (Temporal)   Ht 185.4 cm (72.99\")   Wt 123 kg (271 lb 3.2 oz)   SpO2 98%   BMI 35.79 kg/m²    Class 2 Severe Obesity (BMI >=35 and <=39.9). Obesity-related health conditions include the following: hypertension and dyslipidemias. Obesity is improving with lifestyle modifications. BMI is is above average; BMI management plan is completed. We discussed portion control and increasing exercise.     Physical Exam  Vitals and nursing note reviewed.   Constitutional:       General: He is not in acute distress.     Appearance: Normal appearance. He is obese. He is not toxic-appearing.   HENT:      Head: Normocephalic and atraumatic.      Right Ear: External ear normal.      Left Ear: External ear normal.      Nose: Nose normal.   Eyes:      General:         Right eye: No discharge.         Left eye: No discharge.      Conjunctiva/sclera: Conjunctivae normal.   Cardiovascular:      Rate and Rhythm: Normal rate and regular rhythm.      Pulses: Normal pulses.   Pulmonary:      Effort: Pulmonary effort is normal. No respiratory distress.      Breath sounds: Rhonchi (right) present. No wheezing.   Skin:     General: Skin is warm and dry.   Neurological:      Mental Status: He is alert and oriented to person, place, and time.   Psychiatric:         Mood and Affect: " Mood normal.         Behavior: Behavior normal.         Thought Content: Thought content normal.         Judgment: Judgment normal.         Assessment & Plan   Problems Addressed this Visit          Cardiac and Vasculature    PSVT (paroxysmal supraventricular tachycardia)    Relevant Orders    Holter Monitor - 72 Hour Up To 15 Days    Ambulatory Referral to Cardiac Electrophysiology    Mixed hyperlipidemia    Primary hypertension - Primary       Endocrine and Metabolic    Hypothyroidism    Pre-diabetes       Tobacco    Smoker     Other Visit Diagnoses       Anxiety        Relevant Medications    hydrOXYzine (ATARAX) 10 MG tablet    Prostate cancer screening        Pneumonia due to infectious organism, unspecified laterality, unspecified part of lung        Relevant Orders    XR Chest PA & Lateral (In Office)          Diagnoses         Codes Comments    Primary hypertension    -  Primary ICD-10-CM: I10  ICD-9-CM: 401.9     Acquired hypothyroidism     ICD-10-CM: E03.9  ICD-9-CM: 244.9     Mixed hyperlipidemia     ICD-10-CM: E78.2  ICD-9-CM: 272.2     Pre-diabetes     ICD-10-CM: R73.03  ICD-9-CM: 790.29     PSVT (paroxysmal supraventricular tachycardia)     ICD-10-CM: I47.10  ICD-9-CM: 427.0     Smoker     ICD-10-CM: F17.200  ICD-9-CM: 305.1     Anxiety     ICD-10-CM: F41.9  ICD-9-CM: 300.00     Prostate cancer screening     ICD-10-CM: Z12.5  ICD-9-CM: V76.44     Pneumonia due to infectious organism, unspecified laterality, unspecified part of lung     ICD-10-CM: J18.9  ICD-9-CM: 486             Assessment & Plan  1. Anxiety.  A prescription for hydroxyzine has been renewed and sent to [facility] in [facility]. It can be taken up to three times a day as needed. He is advised to monitor for any excessive sleepiness.    2. Supraventricular tachycardia.  His blood pressure is currently within normal limits at 123/87, and his heart rate is also stable. Given these parameters, discontinuation of lisinopril is deemed  appropriate. Recent lab results will be reviewed to assess thyroid function, as uncontrolled thyroid levels can contribute to SVT. The potential for ablation as a treatment option was discussed, although it is typically reserved for more severe arrhythmias such as atrial fibrillation. Stress management strategies were suggested, including the use of hydroxyzine for anxiety control and avoidance of stimulants such as caffeine and nicotine. He was also advised to maintain adequate hydration and consider smoking cessation. A referral to an electrophysiology cardiologist, Dr. Smiley, will be made. Prior to this consultation, a Holter monitor will be placed for a duration of 2 weeks to assess the frequency and severity of arrhythmias. He will continue his daily regimen of 81 mg baby aspirin, provided it does not cause gastrointestinal upset or bleeding.    3. Health maintenance.  His weight has been on a downward trend since the last visit, with a reported loss of 30 pounds. He declined colon cancer screening. A chest x-ray will be performed today to evaluate the resolution of his previous pneumonia. He was encouraged to receive the pneumonia vaccine to reduce the risk of recurrent pneumonia, particularly given his smoking history. A CT scan for lung cancer screening was also suggested, but deferred due to recent acute illness.    4. Panic attack.               Transcribed from ambient dictation for Cassy Paul MD by Cassy Paul MD.  02/21/25   08:41 CST    Patient or patient representative verbalized consent for the use of Ambient Listening during the visit with  Cassy Paul MD for chart documentation. 2/21/2025  08:48 CST          This document has been electronically signed by Cassy Paul MD on February 21, 2025 08:50 CST

## 2025-02-22 DIAGNOSIS — I10 PRIMARY HYPERTENSION: ICD-10-CM

## 2025-02-26 RX ORDER — LISINOPRIL 10 MG/1
10 TABLET ORAL DAILY
Qty: 90 TABLET | Refills: 0 | OUTPATIENT
Start: 2025-02-26

## 2025-02-26 NOTE — TELEPHONE ENCOUNTER
Discontinued on 02/21/2025    Rx Refill Note  Requested Prescriptions     Pending Prescriptions Disp Refills    lisinopril (PRINIVIL,ZESTRIL) 10 MG tablet [Pharmacy Med Name: LISINOPRIL 10MG TABLETS] 90 tablet 0     Sig: TAKE 1 TABLET BY MOUTH DAILY      Last office visit with prescribing clinician: 2/21/2025   Last telemedicine visit with prescribing clinician: Visit date not found   Next office visit with prescribing clinician: 5/22/2025                         Would you like a call back once the refill request has been completed: [] Yes [] No    If the office needs to give you a call back, can they leave a voicemail: [] Yes [] No    Tamie Lane LPN  02/26/25, 10:28 CST

## 2025-04-04 ENCOUNTER — TELEPHONE (OUTPATIENT)
Dept: FAMILY MEDICINE CLINIC | Facility: CLINIC | Age: 55
End: 2025-04-04
Payer: COMMERCIAL

## 2025-04-04 NOTE — TELEPHONE ENCOUNTER
Pt called back and stated he moved away and is no longer in the area. Pt will obtain a new PCP and obtain new referrals once he gets settled.